# Patient Record
Sex: FEMALE | URBAN - NONMETROPOLITAN AREA
[De-identification: names, ages, dates, MRNs, and addresses within clinical notes are randomized per-mention and may not be internally consistent; named-entity substitution may affect disease eponyms.]

---

## 2023-03-10 LAB
ALBUMIN SERPL-MCNC: 3.2 G/DL (ref 3.5–5.2)
ALP BLD-CCNC: 74 U/L (ref 35–104)
ALT SERPL-CCNC: 8 U/L (ref 0–32)
ANION GAP SERPL CALCULATED.3IONS-SCNC: 7 MMOL/L (ref 7–16)
AST SERPL-CCNC: 21 U/L (ref 0–31)
BASOPHILS ABSOLUTE: 0.12 E9/L (ref 0–0.2)
BASOPHILS RELATIVE PERCENT: 1.6 % (ref 0–2)
BILIRUB SERPL-MCNC: 0.5 MG/DL (ref 0–1.2)
BUN BLDV-MCNC: 16 MG/DL (ref 6–23)
CALCIUM SERPL-MCNC: 9.6 MG/DL (ref 8.6–10.2)
CHLORIDE BLD-SCNC: 103 MMOL/L (ref 98–107)
CHOLESTEROL, TOTAL: 136 MG/DL (ref 0–199)
CO2: 29 MMOL/L (ref 22–29)
CREAT SERPL-MCNC: 0.8 MG/DL (ref 0.5–1)
EOSINOPHILS ABSOLUTE: 0.22 E9/L (ref 0.05–0.5)
EOSINOPHILS RELATIVE PERCENT: 2.8 % (ref 0–6)
GFR SERPL CREATININE-BSD FRML MDRD: >60 ML/MIN/1.73
GLUCOSE BLD-MCNC: 80 MG/DL (ref 74–99)
HCT VFR BLD CALC: 42.2 % (ref 34–48)
HDLC SERPL-MCNC: 64 MG/DL
HEMOGLOBIN: 13.7 G/DL (ref 11.5–15.5)
IMMATURE GRANULOCYTES #: 0.02 E9/L
IMMATURE GRANULOCYTES %: 0.3 % (ref 0–5)
LDL CHOLESTEROL CALCULATED: 53 MG/DL (ref 0–99)
LYMPHOCYTES ABSOLUTE: 2.54 E9/L (ref 1.5–4)
LYMPHOCYTES RELATIVE PERCENT: 32.9 % (ref 20–42)
MCH RBC QN AUTO: 31.9 PG (ref 26–35)
MCHC RBC AUTO-ENTMCNC: 32.5 % (ref 32–34.5)
MCV RBC AUTO: 98.4 FL (ref 80–99.9)
MONOCYTES ABSOLUTE: 0.97 E9/L (ref 0.1–0.95)
MONOCYTES RELATIVE PERCENT: 12.5 % (ref 2–12)
NEUTROPHILS ABSOLUTE: 3.86 E9/L (ref 1.8–7.3)
NEUTROPHILS RELATIVE PERCENT: 49.9 % (ref 43–80)
PDW BLD-RTO: 14 FL (ref 11.5–15)
PLATELET # BLD: 247 E9/L (ref 130–450)
PMV BLD AUTO: 11.9 FL (ref 7–12)
POTASSIUM SERPL-SCNC: 4.3 MMOL/L (ref 3.5–5)
RBC # BLD: 4.29 E12/L (ref 3.5–5.5)
SODIUM BLD-SCNC: 139 MMOL/L (ref 132–146)
TOTAL PROTEIN: 5.3 G/DL (ref 6.4–8.3)
TRIGL SERPL-MCNC: 97 MG/DL (ref 0–149)
TSH SERPL DL<=0.05 MIU/L-ACNC: 5.79 UIU/ML (ref 0.27–4.2)
VITAMIN D 25-HYDROXY: 20 NG/ML (ref 30–100)
VLDLC SERPL CALC-MCNC: 19 MG/DL
WBC # BLD: 7.7 E9/L (ref 4.5–11.5)

## 2023-10-04 ENCOUNTER — OUTSIDE SERVICES (OUTPATIENT)
Dept: FAMILY MEDICINE CLINIC | Age: 88
End: 2023-10-04

## 2023-10-04 DIAGNOSIS — R60.0 BILATERAL LOWER EXTREMITY EDEMA: Primary | ICD-10-CM

## 2023-10-04 DIAGNOSIS — H61.22 IMPACTED CERUMEN OF LEFT EAR: ICD-10-CM

## 2023-10-04 DIAGNOSIS — E78.2 MIXED HYPERLIPIDEMIA: ICD-10-CM

## 2023-10-04 DIAGNOSIS — L03.116 CELLULITIS OF LEFT LOWER EXTREMITY: ICD-10-CM

## 2023-10-04 DIAGNOSIS — L29.9 ITCHING: ICD-10-CM

## 2023-10-04 NOTE — PROGRESS NOTES
10/4/2023    Honey Felton  1920    CC: This resident is being seen today for a follow-up evaluation visit with resident establishment under our services. Social history:  with no children. Denies any alcohol or smoking. Worked as a . Family history: Mother  at the age of 79 from heart complications and father at 68 due to unknown reasons. She has 3 sisters which are all . PMH:  She is a resident who has long-term medical conditions including , hypothyroidism, primary hyperparathyroidism, vitamin D deficiency, mixed hyperlipidemia, hypomagnesemia, hypokalemia, benign essential hypertension, Mobitz type II atrioventricular block, paroxysmal atrial fibrillation, sick sinus syndrome, osteoarthritis of the knee, senile osteoporosis, malignant neoplasm to the breast status post mastectomy, polyp of the colon, cardiac pacemaker in situ. ROS:  She is a 80 y.o. female resident who is being seen today for a follow-up evaluation visit with concerns regarding left lower extremity redness with edema. She was accompanied by her nephew to this visit. He does indicate that she has been at [facility] for the course of the last 3 years with anticipation of going to assisted living next week. He feels that this will be the best move as he has noticed that she has not been overly compliant with her medications. He states that she was actually seen in conjunction with Dr. Dilma sEpinal regarding her right shoulder with some complaints of pain due to a fall and he had placed her on Bactrim along with cephalexin for cellulitis to her lower extremity and he realized that she had not been consistent with her medications. She denies any pain with respect to the leg but also admits that she has had some increased edema to the right lower extremity as well.   She furthermore has no complaints regarding headaches or dizziness, sore throat, chest pain, nausea or vomiting,

## 2023-10-11 LAB
25(OH)D3 SERPL-MCNC: 21.7 NG/ML (ref 30–100)
ALBUMIN SERPL-MCNC: 3.5 G/DL (ref 3.5–5.2)
ALP SERPL-CCNC: 105 U/L (ref 35–104)
ALT SERPL-CCNC: 8 U/L (ref 0–32)
ANION GAP SERPL CALCULATED.3IONS-SCNC: 15 MMOL/L (ref 7–16)
AST SERPL-CCNC: 18 U/L (ref 0–31)
BASOPHILS # BLD: 0.13 K/UL (ref 0–0.2)
BASOPHILS NFR BLD: 2 % (ref 0–2)
BILIRUB SERPL-MCNC: 0.2 MG/DL (ref 0–1.2)
BUN SERPL-MCNC: 24 MG/DL (ref 6–23)
CALCIUM SERPL-MCNC: 10.2 MG/DL (ref 8.6–10.2)
CHLORIDE SERPL-SCNC: 106 MMOL/L (ref 98–107)
CHOLEST SERPL-MCNC: 153 MG/DL
CO2 SERPL-SCNC: 24 MMOL/L (ref 22–29)
CREAT SERPL-MCNC: 1.3 MG/DL (ref 0.5–1)
EOSINOPHIL # BLD: 0.27 K/UL (ref 0.05–0.5)
EOSINOPHILS RELATIVE PERCENT: 3 % (ref 0–6)
ERYTHROCYTE [DISTWIDTH] IN BLOOD BY AUTOMATED COUNT: 13.7 % (ref 11.5–15)
GFR SERPL CREATININE-BSD FRML MDRD: 38 ML/MIN/1.73M2
GLUCOSE SERPL-MCNC: 102 MG/DL (ref 74–99)
HCT VFR BLD AUTO: 41.7 % (ref 34–48)
HDLC SERPL-MCNC: 81 MG/DL
HGB BLD-MCNC: 13.1 G/DL (ref 11.5–15.5)
IMM GRANULOCYTES # BLD AUTO: 0.08 K/UL (ref 0–0.58)
IMM GRANULOCYTES NFR BLD: 1 % (ref 0–5)
LDLC SERPL CALC-MCNC: 55 MG/DL
LYMPHOCYTES NFR BLD: 2.21 K/UL (ref 1.5–4)
LYMPHOCYTES RELATIVE PERCENT: 26 % (ref 20–42)
MCH RBC QN AUTO: 32.2 PG (ref 26–35)
MCHC RBC AUTO-ENTMCNC: 31.4 G/DL (ref 32–34.5)
MCV RBC AUTO: 102.5 FL (ref 80–99.9)
MONOCYTES NFR BLD: 1.18 K/UL (ref 0.1–0.95)
MONOCYTES NFR BLD: 14 % (ref 2–12)
NEUTROPHILS NFR BLD: 55 % (ref 43–80)
NEUTS SEG NFR BLD: 4.76 K/UL (ref 1.8–7.3)
PLATELET # BLD AUTO: 402 K/UL (ref 130–450)
PMV BLD AUTO: 10.3 FL (ref 7–12)
POTASSIUM SERPL-SCNC: 4.6 MMOL/L (ref 3.5–5)
PROT SERPL-MCNC: 6.6 G/DL (ref 6.4–8.3)
RBC # BLD AUTO: 4.07 M/UL (ref 3.5–5.5)
SODIUM SERPL-SCNC: 145 MMOL/L (ref 132–146)
TRIGL SERPL-MCNC: 85 MG/DL
TSH SERPL DL<=0.05 MIU/L-ACNC: 3.87 UIU/ML (ref 0.27–4.2)
VIT B12 SERPL-MCNC: 727 PG/ML (ref 211–946)
VLDLC SERPL CALC-MCNC: 17 MG/DL
WBC OTHER # BLD: 8.6 K/UL (ref 4.5–11.5)

## 2023-10-18 LAB
ANION GAP SERPL CALCULATED.3IONS-SCNC: 12 MMOL/L (ref 7–16)
BUN SERPL-MCNC: 39 MG/DL (ref 6–23)
CALCIUM SERPL-MCNC: 10 MG/DL (ref 8.6–10.2)
CHLORIDE SERPL-SCNC: 108 MMOL/L (ref 98–107)
CO2 SERPL-SCNC: 25 MMOL/L (ref 22–29)
CREAT SERPL-MCNC: 1.8 MG/DL (ref 0.5–1)
GFR SERPL CREATININE-BSD FRML MDRD: 25 ML/MIN/1.73M2
GLUCOSE SERPL-MCNC: 100 MG/DL (ref 74–99)
POTASSIUM SERPL-SCNC: 4.2 MMOL/L (ref 3.5–5)
SODIUM SERPL-SCNC: 145 MMOL/L (ref 132–146)

## 2023-10-19 ENCOUNTER — OUTSIDE SERVICES (OUTPATIENT)
Dept: FAMILY MEDICINE CLINIC | Age: 88
End: 2023-10-19

## 2023-10-19 DIAGNOSIS — R94.4 DECREASED GFR: ICD-10-CM

## 2023-10-19 DIAGNOSIS — R60.0 BILATERAL LOWER EXTREMITY EDEMA: ICD-10-CM

## 2023-10-19 DIAGNOSIS — E78.2 MIXED HYPERLIPIDEMIA: ICD-10-CM

## 2023-10-19 DIAGNOSIS — E03.9 HYPOTHYROIDISM, UNSPECIFIED TYPE: ICD-10-CM

## 2023-10-19 DIAGNOSIS — E55.9 VITAMIN D DEFICIENCY: Primary | ICD-10-CM

## 2023-10-20 NOTE — PROGRESS NOTES
10/19/2023    Harris Diallo  8/17/1920    This resident is being seen today for a follow-up evaluation visit with resident transition to assisted living. She is a resident who has long-term medical conditions including , hypothyroidism, primary hyperparathyroidism, vitamin D deficiency, mixed hyperlipidemia, hypomagnesemia, hypokalemia, benign essential hypertension, Mobitz type II atrioventricular block, paroxysmal atrial fibrillation, sick sinus syndrome, osteoarthritis of the knee, senile osteoporosis, malignant neoplasm to the breast status post mastectomy, polyp of the colon, cardiac pacemaker in situ. She is a 80 y.o. female resident who is being seen today for a follow-up evaluation with resident transitioning to assisted living. Staff does indicate that she has been tolerating her move rather well. She was in no acute distress on today's evaluation and offers no complaints with respect to pain, headaches or dizziness, sore throat, chest pain, coughing or shortness of breath, nausea or vomiting, constipation or diarrhea, fever or chills, falls or syncopal events. Medications:  Potassium chloride 10 mill equivalents daily  Simvastatin 20 mg daily  Levothyroxine 50 mcg daily  Diltiazem 180 mg daily  Aspirin 81 mg daily  Vitamin D3 3000 units daily  Alendronate 70 mg weekly        Objective     Vital Signs: /55 pulse 101 respirations 18 temperature 97.6 O2 95% weight 144. 4        Physical examination:Skin is essentially warm and dry. HEENT left ear with cerumen impaction but otherwise unremarkable. Neck is supple. Heart regular rate and rhythm. No rubs, gallops or murmurs noted. Lungs are clear to auscultation. No evidence of rhonchi, rales, or wheezing. Abdomen is soft, supple and non-tender. Bowel sounds are noted x4 quadrants. No rigidity, guarding or rebound tenderness. Negative Abraham's, negative McBurney's, negative Tomas's.   Extremities; 1+ edema noted to the bilateral lower

## 2023-10-26 ENCOUNTER — OUTSIDE SERVICES (OUTPATIENT)
Dept: FAMILY MEDICINE CLINIC | Age: 88
End: 2023-10-26

## 2023-10-26 DIAGNOSIS — M81.0 AGE-RELATED OSTEOPOROSIS WITHOUT CURRENT PATHOLOGICAL FRACTURE: ICD-10-CM

## 2023-10-26 DIAGNOSIS — R13.10 DYSPHAGIA, UNSPECIFIED TYPE: Primary | ICD-10-CM

## 2023-10-26 DIAGNOSIS — E21.3 HYPERPARATHYROIDISM (HCC): ICD-10-CM

## 2023-10-26 DIAGNOSIS — E83.42 HYPOMAGNESEMIA: ICD-10-CM

## 2023-10-26 DIAGNOSIS — E03.9 HYPOTHYROIDISM, UNSPECIFIED TYPE: ICD-10-CM

## 2023-10-26 LAB
ANION GAP SERPL CALCULATED.3IONS-SCNC: 13 MMOL/L (ref 7–16)
BUN SERPL-MCNC: 24 MG/DL (ref 6–23)
CALCIUM SERPL-MCNC: 9 MG/DL (ref 8.6–10.2)
CHLORIDE SERPL-SCNC: 107 MMOL/L (ref 98–107)
CO2 SERPL-SCNC: 25 MMOL/L (ref 22–29)
CREAT SERPL-MCNC: 1.5 MG/DL (ref 0.5–1)
GFR SERPL CREATININE-BSD FRML MDRD: 32 ML/MIN/1.73M2
GLUCOSE SERPL-MCNC: 93 MG/DL (ref 74–99)
POTASSIUM SERPL-SCNC: 3.9 MMOL/L (ref 3.5–5)
SODIUM SERPL-SCNC: 145 MMOL/L (ref 132–146)

## 2023-11-10 ENCOUNTER — OUTSIDE SERVICES (OUTPATIENT)
Dept: FAMILY MEDICINE CLINIC | Age: 88
End: 2023-11-10

## 2023-11-10 DIAGNOSIS — R94.4 DECREASED GFR: ICD-10-CM

## 2023-11-10 DIAGNOSIS — M81.0 AGE-RELATED OSTEOPOROSIS WITHOUT CURRENT PATHOLOGICAL FRACTURE: ICD-10-CM

## 2023-11-10 DIAGNOSIS — E55.9 VITAMIN D DEFICIENCY: Primary | ICD-10-CM

## 2023-11-10 DIAGNOSIS — E03.9 HYPOTHYROIDISM, UNSPECIFIED TYPE: ICD-10-CM

## 2023-11-10 DIAGNOSIS — E78.2 MIXED HYPERLIPIDEMIA: ICD-10-CM

## 2023-11-11 NOTE — PROGRESS NOTES
11/9/2023        Manolo lAlen  8/17/1920    This resident is being seen today for a follow-up evaluation visit with resident transition to assisted living. She is a resident who has long-term medical conditions including , hypothyroidism, primary hyperparathyroidism, vitamin D deficiency, mixed hyperlipidemia, hypomagnesemia, hypokalemia, benign essential hypertension, Mobitz type II atrioventricular block, paroxysmal atrial fibrillation, sick sinus syndrome, osteoarthritis of the knee, senile osteoporosis, malignant neoplasm to the breast status post mastectomy, polyp of the colon, cardiac pacemaker in situ. She is a 80 y.o. female resident who is being seen today for a follow-up visit with this resident recently seen in conjunction with orthopedics on November 7 and recommendations were given to continue with physical therapy for strengthening purposes. She was very pleasant on today's evaluation and denied any complaints. She had no issues with any headaches or dizziness, sore throat, chest pain, coughing or shortness of breath, nausea or vomiting, constipation or diarrhea, fever or chills, falls or syncopal events. Medications:  Potassium chloride 10 mill equivalents daily  Simvastatin 20 mg daily  Levothyroxine 50 mcg daily  Diltiazem 180 mg daily  Aspirin 81 mg daily  Vitamin D3 3000 units daily  Alendronate 70 mg weekly        Objective     Vital Signs: /60 pulse 90 respirations 18 temperature 96.7 O2 95% weight 135.4        Physical examination:Skin is essentially warm and dry. HEENT otherwise unremarkable. Neck is supple. Heart regular rate and rhythm. No rubs, gallops or murmurs noted. Lungs are clear to auscultation. No evidence of rhonchi, rales, or wheezing. Abdomen is soft, supple and non-tender. Bowel sounds are noted x4 quadrants. No rigidity, guarding or rebound tenderness. Negative Abraham's, negative McBurney's, negative Tomas's.   Extremities; 1+ edema noted to the

## 2023-12-06 ENCOUNTER — OUTSIDE SERVICES (OUTPATIENT)
Dept: FAMILY MEDICINE CLINIC | Age: 88
End: 2023-12-06

## 2023-12-06 DIAGNOSIS — E78.2 MIXED HYPERLIPIDEMIA: Primary | ICD-10-CM

## 2023-12-06 DIAGNOSIS — E03.9 HYPOTHYROIDISM, UNSPECIFIED TYPE: ICD-10-CM

## 2023-12-06 DIAGNOSIS — E21.3 HYPERPARATHYROIDISM (HCC): ICD-10-CM

## 2023-12-06 DIAGNOSIS — E83.42 HYPOMAGNESEMIA: ICD-10-CM

## 2023-12-06 DIAGNOSIS — M81.0 AGE-RELATED OSTEOPOROSIS WITHOUT CURRENT PATHOLOGICAL FRACTURE: ICD-10-CM

## 2023-12-07 NOTE — PROGRESS NOTES
11/9/2023        Cherylene Sport  8/17/1920    This resident is being seen today for a follow-up evaluation visit with resident transition to assisted living. She is a resident who has long-term medical conditions including , hypothyroidism, primary hyperparathyroidism, vitamin D deficiency, mixed hyperlipidemia, hypomagnesemia, hypokalemia, benign essential hypertension, Mobitz type II atrioventricular block, paroxysmal atrial fibrillation, sick sinus syndrome, osteoarthritis of the knee, senile osteoporosis, malignant neoplasm to the breast status post mastectomy, polyp of the colon, cardiac pacemaker in situ. She is a 80 y.o. female resident who is being seen today for a follow-up visit evaluation with this resident alert and responsive to verbal and tactile stimuli and very pleasant [place] Mussel kidding that she has had no changes in behavior in terms of aggressive, combative, or disruptive behaviors. She offers no acute complaints with respect to pain, headaches or dizziness, sore throat, chest pain, coughing or shortness of breath, nausea or vomiting, constipation or diarrhea, fever or chills, falls or syncopal events. Medications:  Potassium chloride 10 mill equivalents daily  Simvastatin 20 mg daily  Levothyroxine 50 mcg daily  Diltiazem 180 mg daily  Aspirin 81 mg daily  Vitamin D3 3000 units daily  Alendronate 70 mg weekly  Niacin 1000 mg twice daily        Objective     Vital Signs: /60 pulse 90 respirations 18 temperature 96.7 O2 95% weight 135.4        Physical examination:Skin is essentially warm and dry. HEENT otherwise unremarkable. Neck is supple. Heart regular rate and rhythm. No rubs, gallops or murmurs noted. Lungs are clear to auscultation. No evidence of rhonchi, rales, or wheezing. Abdomen is soft, supple and non-tender. Bowel sounds are noted x4 quadrants. No rigidity, guarding or rebound tenderness. Negative Abraham's, negative McBurney's, negative Tomas's.

## 2023-12-29 LAB
MICROORGANISM SPEC CULT: ABNORMAL
MICROORGANISM/AGENT SPEC: ABNORMAL
SPECIMEN DESCRIPTION: ABNORMAL

## 2024-01-05 LAB
ALBUMIN SERPL-MCNC: 2.6 G/DL (ref 3.5–5.2)
ALP SERPL-CCNC: 78 U/L (ref 35–104)
ALT SERPL-CCNC: 14 U/L (ref 0–32)
ANION GAP SERPL CALCULATED.3IONS-SCNC: 10 MMOL/L (ref 7–16)
AST SERPL-CCNC: 32 U/L (ref 0–31)
BASOPHILS # BLD: 0.09 K/UL (ref 0–0.2)
BASOPHILS NFR BLD: 1 % (ref 0–2)
BILIRUB SERPL-MCNC: 0.4 MG/DL (ref 0–1.2)
BUN SERPL-MCNC: 11 MG/DL (ref 6–23)
CALCIUM SERPL-MCNC: 8.8 MG/DL (ref 8.6–10.2)
CHLORIDE SERPL-SCNC: 104 MMOL/L (ref 98–107)
CO2 SERPL-SCNC: 28 MMOL/L (ref 22–29)
CREAT SERPL-MCNC: 0.9 MG/DL (ref 0.5–1)
EOSINOPHIL # BLD: 0.34 K/UL (ref 0.05–0.5)
EOSINOPHILS RELATIVE PERCENT: 3 % (ref 0–6)
ERYTHROCYTE [DISTWIDTH] IN BLOOD BY AUTOMATED COUNT: 17.5 % (ref 11.5–15)
GFR SERPL CREATININE-BSD FRML MDRD: 55 ML/MIN/1.73M2
GLUCOSE SERPL-MCNC: 77 MG/DL (ref 74–99)
HCT VFR BLD AUTO: 33.1 % (ref 34–48)
HGB BLD-MCNC: 10.4 G/DL (ref 11.5–15.5)
IMM GRANULOCYTES # BLD AUTO: 0.25 K/UL (ref 0–0.58)
IMM GRANULOCYTES NFR BLD: 2 % (ref 0–5)
LYMPHOCYTES NFR BLD: 1.63 K/UL (ref 1.5–4)
LYMPHOCYTES RELATIVE PERCENT: 15 % (ref 20–42)
MCH RBC QN AUTO: 31.3 PG (ref 26–35)
MCHC RBC AUTO-ENTMCNC: 31.4 G/DL (ref 32–34.5)
MCV RBC AUTO: 99.7 FL (ref 80–99.9)
MONOCYTES NFR BLD: 1.32 K/UL (ref 0.1–0.95)
MONOCYTES NFR BLD: 12 % (ref 2–12)
NEUTROPHILS NFR BLD: 67 % (ref 43–80)
NEUTS SEG NFR BLD: 7.49 K/UL (ref 1.8–7.3)
PLATELET # BLD AUTO: 218 K/UL (ref 130–450)
PMV BLD AUTO: 10.6 FL (ref 7–12)
POTASSIUM SERPL-SCNC: 3.8 MMOL/L (ref 3.5–5)
PROT SERPL-MCNC: 5.3 G/DL (ref 6.4–8.3)
RBC # BLD AUTO: 3.32 M/UL (ref 3.5–5.5)
SODIUM SERPL-SCNC: 142 MMOL/L (ref 132–146)
WBC OTHER # BLD: 11.1 K/UL (ref 4.5–11.5)

## 2024-01-07 LAB
SEND OUT REPORT: NORMAL
TEST NAME: NORMAL

## 2024-01-11 ENCOUNTER — OUTSIDE SERVICES (OUTPATIENT)
Dept: FAMILY MEDICINE CLINIC | Age: 89
End: 2024-01-11

## 2024-01-11 DIAGNOSIS — N17.9 AKI (ACUTE KIDNEY INJURY) (HCC): ICD-10-CM

## 2024-01-11 DIAGNOSIS — N18.9 CHRONIC KIDNEY DISEASE, UNSPECIFIED CKD STAGE: ICD-10-CM

## 2024-01-11 DIAGNOSIS — Z76.89 ENCOUNTER TO ESTABLISH CARE: Primary | ICD-10-CM

## 2024-01-11 DIAGNOSIS — L03.116 CELLULITIS OF LEFT LOWER EXTREMITY: ICD-10-CM

## 2024-01-11 DIAGNOSIS — R94.4 DECREASED GFR: ICD-10-CM

## 2024-01-17 ENCOUNTER — OUTSIDE SERVICES (OUTPATIENT)
Dept: FAMILY MEDICINE CLINIC | Age: 89
End: 2024-01-17

## 2024-01-17 DIAGNOSIS — E55.9 VITAMIN D DEFICIENCY: Primary | ICD-10-CM

## 2024-01-17 DIAGNOSIS — E03.9 HYPOTHYROIDISM, UNSPECIFIED TYPE: ICD-10-CM

## 2024-01-17 DIAGNOSIS — M81.0 AGE-RELATED OSTEOPOROSIS WITHOUT CURRENT PATHOLOGICAL FRACTURE: ICD-10-CM

## 2024-01-17 DIAGNOSIS — E78.2 MIXED HYPERLIPIDEMIA: ICD-10-CM

## 2024-01-17 DIAGNOSIS — R60.0 BILATERAL LOWER EXTREMITY EDEMA: ICD-10-CM

## 2024-01-18 LAB
25(OH)D3 SERPL-MCNC: 33.9 NG/ML (ref 30–100)
ALBUMIN SERPL-MCNC: 2.9 G/DL (ref 3.5–5.2)
ALP SERPL-CCNC: 79 U/L (ref 35–104)
ALT SERPL-CCNC: 7 U/L (ref 0–32)
ANION GAP SERPL CALCULATED.3IONS-SCNC: 12 MMOL/L (ref 7–16)
AST SERPL-CCNC: 22 U/L (ref 0–31)
BASOPHILS # BLD: 0.07 K/UL (ref 0–0.2)
BASOPHILS NFR BLD: 1 % (ref 0–2)
BILIRUB SERPL-MCNC: 0.4 MG/DL (ref 0–1.2)
BUN SERPL-MCNC: 16 MG/DL (ref 6–23)
CALCIUM SERPL-MCNC: 9.7 MG/DL (ref 8.6–10.2)
CHLORIDE SERPL-SCNC: 101 MMOL/L (ref 98–107)
CO2 SERPL-SCNC: 26 MMOL/L (ref 22–29)
CREAT SERPL-MCNC: 1 MG/DL (ref 0.5–1)
EOSINOPHIL # BLD: 0.41 K/UL (ref 0.05–0.5)
EOSINOPHILS RELATIVE PERCENT: 4 % (ref 0–6)
ERYTHROCYTE [DISTWIDTH] IN BLOOD BY AUTOMATED COUNT: 17.2 % (ref 11.5–15)
GFR SERPL CREATININE-BSD FRML MDRD: 51 ML/MIN/1.73M2
GLUCOSE SERPL-MCNC: 85 MG/DL (ref 74–99)
HCT VFR BLD AUTO: 29.9 % (ref 34–48)
HGB BLD-MCNC: 9.5 G/DL (ref 11.5–15.5)
IMM GRANULOCYTES # BLD AUTO: 0.13 K/UL (ref 0–0.58)
IMM GRANULOCYTES NFR BLD: 1 % (ref 0–5)
LYMPHOCYTES NFR BLD: 1.62 K/UL (ref 1.5–4)
LYMPHOCYTES RELATIVE PERCENT: 14 % (ref 20–42)
MCH RBC QN AUTO: 31.7 PG (ref 26–35)
MCHC RBC AUTO-ENTMCNC: 31.8 G/DL (ref 32–34.5)
MCV RBC AUTO: 99.7 FL (ref 80–99.9)
MONOCYTES NFR BLD: 1.07 K/UL (ref 0.1–0.95)
MONOCYTES NFR BLD: 9 % (ref 2–12)
NEUTROPHILS NFR BLD: 71 % (ref 43–80)
NEUTS SEG NFR BLD: 8.06 K/UL (ref 1.8–7.3)
PLATELET # BLD AUTO: 205 K/UL (ref 130–450)
PMV BLD AUTO: 10.6 FL (ref 7–12)
POTASSIUM SERPL-SCNC: 4.1 MMOL/L (ref 3.5–5)
PROT SERPL-MCNC: 6.2 G/DL (ref 6.4–8.3)
RBC # BLD AUTO: 3 M/UL (ref 3.5–5.5)
SODIUM SERPL-SCNC: 139 MMOL/L (ref 132–146)
TSH SERPL DL<=0.05 MIU/L-ACNC: 1.71 UIU/ML (ref 0.27–4.2)
WBC OTHER # BLD: 11.4 K/UL (ref 4.5–11.5)

## 2024-01-18 NOTE — PROGRESS NOTES
1/17/2024      Kary Arango  8/17/1920    This resident is being seen today for a follow-up evaluation visit with resident transition to assisted living.   She is a resident who has long-term medical conditions including , hypothyroidism, primary hyperparathyroidism, vitamin D deficiency, mixed hyperlipidemia, hypomagnesemia, hypokalemia, benign essential hypertension, Mobitz type II atrioventricular block, paroxysmal atrial fibrillation, sick sinus syndrome, osteoarthritis of the knee, senile osteoporosis, malignant neoplasm to the breast status post mastectomy, polyp of the colon, cardiac pacemaker in situ. She is a 89 y.o. female resident who is being seen today for a follow-up visit with this resident recently returning from [facility] on January 15, 2024.  She is a resident who had an apparent chest x-ray completed December 24 was given the benefit of Rocephin therapies for the course of 3 days and was given oxygen supplementation as well as after diuretics and oral antibiotic therapy but unfortunately had made no improvements and was sent to the emergency room setting by December 31.  At this time she has no upper respiratory distress and furthermore denies any complaints in terms of sore throat, chest pain, coughing or shortness of breath, nausea or vomiting, constipation or diarrhea, fever or chills, syncopal events or seizure activity.            Medications:  Calcium carbonate 750 mg daily  Vitamin D3 3000 units daily  K-Tab 20 mill equivalents daily  Niacinamide 500 mg twice daily  Levothyroxine 50 mcg daily  Multivitamin with mineral daily  Cardizem  mg daily  Tylenol arthritis 650 mg every 4 hours as needed  Atorvastatin 10 mg daily  Aspirin 81 mg daily   Bumex 1 mg twice daily            Objective     Vital Signs: /68 pulse 62 respirations 18 temperature 97.8 O2 92% weight 135.4        Physical examination:Skin is essentially warm and dry. HEENT otherwise

## 2024-02-07 LAB
BASOPHILS # BLD: 0.09 K/UL (ref 0–0.2)
BASOPHILS NFR BLD: 1 % (ref 0–2)
EOSINOPHIL # BLD: 0.47 K/UL (ref 0.05–0.5)
EOSINOPHILS RELATIVE PERCENT: 6 % (ref 0–6)
ERYTHROCYTE [DISTWIDTH] IN BLOOD BY AUTOMATED COUNT: 16.8 % (ref 11.5–15)
FERRITIN SERPL-MCNC: 165 NG/ML
HCT VFR BLD AUTO: 31.8 % (ref 34–48)
HGB BLD-MCNC: 10.1 G/DL (ref 11.5–15.5)
IMM GRANULOCYTES # BLD AUTO: 0.03 K/UL (ref 0–0.58)
IMM GRANULOCYTES NFR BLD: 0 % (ref 0–5)
IRON SATN MFR SERPL: 26 % (ref 15–50)
IRON SERPL-MCNC: 49 UG/DL (ref 37–145)
LYMPHOCYTES NFR BLD: 2.33 K/UL (ref 1.5–4)
LYMPHOCYTES RELATIVE PERCENT: 31 % (ref 20–42)
MCH RBC QN AUTO: 32.4 PG (ref 26–35)
MCHC RBC AUTO-ENTMCNC: 31.8 G/DL (ref 32–34.5)
MCV RBC AUTO: 101.9 FL (ref 80–99.9)
MONOCYTES NFR BLD: 1.14 K/UL (ref 0.1–0.95)
MONOCYTES NFR BLD: 15 % (ref 2–12)
NEUTROPHILS NFR BLD: 46 % (ref 43–80)
NEUTS SEG NFR BLD: 3.5 K/UL (ref 1.8–7.3)
PLATELET # BLD AUTO: 192 K/UL (ref 130–450)
PMV BLD AUTO: 10.2 FL (ref 7–12)
RBC # BLD AUTO: 3.12 M/UL (ref 3.5–5.5)
TIBC SERPL-MCNC: 192 UG/DL (ref 250–450)
WBC OTHER # BLD: 7.6 K/UL (ref 4.5–11.5)

## 2024-02-09 ENCOUNTER — OUTSIDE SERVICES (OUTPATIENT)
Dept: FAMILY MEDICINE CLINIC | Age: 89
End: 2024-02-09

## 2024-02-09 DIAGNOSIS — E03.9 HYPOTHYROIDISM, UNSPECIFIED TYPE: ICD-10-CM

## 2024-02-09 DIAGNOSIS — R79.0 DECREASED IRON STORES: ICD-10-CM

## 2024-02-09 DIAGNOSIS — E55.9 VITAMIN D DEFICIENCY: ICD-10-CM

## 2024-02-09 DIAGNOSIS — N18.9 CHRONIC KIDNEY DISEASE, UNSPECIFIED CKD STAGE: ICD-10-CM

## 2024-02-09 DIAGNOSIS — R63.4 WEIGHT LOSS: Primary | ICD-10-CM

## 2024-02-09 NOTE — PROGRESS NOTES
rales, or wheezing. Abdomen is soft, supple and non-tender.  Bowel sounds are noted x4 quadrants. No rigidity, guarding or rebound tenderness.  Negative Abraham's, negative McBurney's, negative Tomas's.  Extremities; mild edema noted to the bilateral lower extremities with the left lower extremity greater than the right with some evidence of redness bilaterally and scabbing to the left lower extremity.  Pulses are adequate. No clubbing  or no cyanosis noted.  Neurologically she  is alert and oriented x3.  No evidence of paralysis or paresthesias noted.    Diagnoses and all orders for this visit:    Weight loss  Comments:  Discontinue atorvastatin and initiate Remeron 7.5 mg daily    Decreased iron stores  Comments:  Initiate ferrous sulfate 325 mg daily    Chronic kidney disease, unspecified CKD stage  Comments:  Maintain lowest effective dosing of diuretic management    Hypothyroidism, unspecified type  Comments:  Currently controlled with levothyroxine    Vitamin D deficiency  Comments:  Controlled with vitamin D supplementation                      Plan:  Plan of care was discussed with the healthcare team with medications and labs reviewed.  H/H10.1/31.8 TIBC 192 iron 49 ferritin 165 TSH 1.71.  Due to the deficiency in the iron level, I am did recommend ferrous sulfate 325 mg daily.  I am furthermore going to discontinue her atorvastatin due to weight loss and place her on low-dose Remeron 7.5 mg at bedtime.  I am going to discontinue the recommendation for the stools for occult blood due to her age and the fact that there would be no further intervention in terms of GI workup.  She is otherwise can continue with her current management as stated and I will see her routinely and as needed with further orders forthcoming.    JULIA FERNANDEZ, APRN - CNP      *Note was creating using voice recognition software.  The document was reviewed however grammatical errors may exist.

## 2024-04-10 ENCOUNTER — OUTSIDE SERVICES (OUTPATIENT)
Dept: FAMILY MEDICINE CLINIC | Age: 89
End: 2024-04-10

## 2024-04-10 DIAGNOSIS — E78.2 MIXED HYPERLIPIDEMIA: ICD-10-CM

## 2024-04-10 DIAGNOSIS — R60.0 BILATERAL LOWER EXTREMITY EDEMA: ICD-10-CM

## 2024-04-10 DIAGNOSIS — E21.3 HYPERPARATHYROIDISM (HCC): ICD-10-CM

## 2024-04-10 DIAGNOSIS — R94.4 DECREASED GFR: ICD-10-CM

## 2024-04-10 DIAGNOSIS — L03.119 CELLULITIS OF LOWER EXTREMITY, UNSPECIFIED LATERALITY: Primary | ICD-10-CM

## 2024-04-11 NOTE — PROGRESS NOTES
4/10/2024      Kary Najeramarylu  8/17/1920    This resident is being seen today for a follow-up evaluation visit.   She is a resident who has long-term medical conditions including , hypothyroidism, primary hyperparathyroidism, vitamin D deficiency, mixed hyperlipidemia, hypomagnesemia, hypokalemia, benign essential hypertension, Mobitz type II atrioventricular block, paroxysmal atrial fibrillation, sick sinus syndrome, osteoarthritis of the knee, senile osteoporosis, malignant neoplasm to the breast status post mastectomy, polyp of the colon, cardiac pacemaker in situ. She is a 89 y.o. female resident who is being seen today for a follow-up visit with which this resident was recently assessed for underlying edema to the bilateral lower extremities with cellulitis and was treated with doxycycline for 10 days with Bactroban to open areas with Aquaphor.  She states that the area is mostly itchy as opposed to pain.  She furthermore states that she has been doing well and denies any headaches or dizziness, sore throat, chest pain, coughing or shortness of breath, nausea or vomiting, constipation or diarrhea, fever or chills, falls or syncopal events.          Medications:  Calcium carbonate 750 mg daily  Vitamin D3 3000 units daily  K-Tab 20 mill equivalents daily  Niacinamide 500 mg twice daily  Levothyroxine 50 mcg daily  Multivitamin with mineral daily  Cardizem  mg daily  Tylenol arthritis 650 mg every 4 hours as needed  Aspirin 81 mg daily   Bumex 1 mg twice daily  Remeron 7.5 mg at bedtime  Ferrous sulfate 325 mg daily          Objective     Vital Signs: /65 pulse 74 respirations 18 temperature 97.4 O2 92% weight 118.2 pounds       Physical examination:Skin is essentially warm and dry. HEENT otherwise unremarkable. Neck is supple. Heart regular rate and rhythm. No rubs, gallops or murmurs noted.  Lungs are clear to auscultation.  No evidence of rhonchi, rales, or wheezing. Abdomen is soft,

## 2024-05-02 ENCOUNTER — OUTSIDE SERVICES (OUTPATIENT)
Dept: FAMILY MEDICINE CLINIC | Age: 89
End: 2024-05-02
Payer: MEDICARE

## 2024-05-02 DIAGNOSIS — E55.9 VITAMIN D DEFICIENCY: ICD-10-CM

## 2024-05-02 DIAGNOSIS — R23.8 SKIN BREAKDOWN: ICD-10-CM

## 2024-05-02 DIAGNOSIS — N18.9 CHRONIC KIDNEY DISEASE, UNSPECIFIED CKD STAGE: ICD-10-CM

## 2024-05-02 DIAGNOSIS — K59.00 CONSTIPATION, UNSPECIFIED CONSTIPATION TYPE: Primary | ICD-10-CM

## 2024-05-02 DIAGNOSIS — E03.9 HYPOTHYROIDISM, UNSPECIFIED TYPE: ICD-10-CM

## 2024-05-02 PROCEDURE — 99349 HOME/RES VST EST MOD MDM 40: CPT | Performed by: NURSE PRACTITIONER

## 2024-05-03 NOTE — PROGRESS NOTES
5/2/2024      Kary Trujillos  8/17/1920    This resident is being seen today for a follow-up evaluation visit.   She is a resident who has long-term medical conditions including , hypothyroidism, primary hyperparathyroidism, vitamin D deficiency, mixed hyperlipidemia, hypomagnesemia, hypokalemia, benign essential hypertension, Mobitz type II atrioventricular block, paroxysmal atrial fibrillation, sick sinus syndrome, osteoarthritis of the knee, senile osteoporosis, malignant neoplasm to the breast status post mastectomy, polyp of the colon, cardiac pacemaker in situ. She is a 89 y.o. female resident who is being seen today for a follow-up evaluation with resident indicating today that she has some underlying constipation.  Furthermore, she does remain under assessment for some areas to her bilateral lower extremities with which staff indicates that she does tend to scratch at when given the opportunity.  Indicate that the legs were doing remarkably well until the wraps were left off for her shower and she apparently scratched them and worsened them.  However, there is no infectious etiology noted.  She denies complaints of pain with respect to the areas and furthermore has no headaches or dizziness, sore throat, chest pain, coughing or shortness of breath, nausea or vomiting, diarrhea, fever or chills, falls or syncopal events.          Medications:  Calcium carbonate 750 mg daily  Vitamin D3 3000 units daily  K-Tab 20 mill equivalents daily  Niacinamide 500 mg twice daily  Levothyroxine 50 mcg daily  Multivitamin with mineral daily  Cardizem  mg daily  Tylenol arthritis 650 mg every 4 hours as needed  Aspirin 81 mg daily   Bumex 1 mg twice daily  Remeron 7.5 mg at bedtime  Ferrous sulfate 325 mg daily  Colace 100 mg daily          Objective     Vital Signs: /53 pulse 61 respirations 18 temperature 97.3 O2 95% weight 114.6 pounds       Physical examination:Skin is essentially warm and dry. HEENT

## 2024-06-20 ENCOUNTER — OUTSIDE SERVICES (OUTPATIENT)
Dept: FAMILY MEDICINE CLINIC | Age: 89
End: 2024-06-20

## 2024-06-20 DIAGNOSIS — R23.8 SKIN BREAKDOWN: Primary | ICD-10-CM

## 2024-06-20 DIAGNOSIS — E21.3 HYPERPARATHYROIDISM (HCC): ICD-10-CM

## 2024-06-20 DIAGNOSIS — E78.2 MIXED HYPERLIPIDEMIA: ICD-10-CM

## 2024-06-20 DIAGNOSIS — E55.9 VITAMIN D DEFICIENCY: ICD-10-CM

## 2024-06-20 DIAGNOSIS — M81.0 AGE-RELATED OSTEOPOROSIS WITHOUT CURRENT PATHOLOGICAL FRACTURE: ICD-10-CM

## 2024-06-20 NOTE — PROGRESS NOTES
6/20/2024      Kary Najeramarylu  8/17/1920    This resident is being seen today for a follow-up evaluation visit.   She is a resident who has long-term medical conditions including , hypothyroidism, primary hyperparathyroidism, vitamin D deficiency, mixed hyperlipidemia, hypomagnesemia, hypokalemia, benign essential hypertension, Mobitz type II atrioventricular block, paroxysmal atrial fibrillation, sick sinus syndrome, osteoarthritis of the knee, senile osteoporosis, malignant neoplasm to the breast status post mastectomy, polyp of the colon, cardiac pacemaker in situ. She is a 89 y.o. female resident who is being seen today for a follow-up visit with which she has had some ongoing concerns to her bilateral lower extremities and they have been being treated open routinely but unfortunately have not had complete resolution at this time.  She does not admit to any pain associated with her legs but just \"itching.\"  She has no headaches or dizziness, sore throat, chest pain, nausea or vomiting, constipation or diarrhea, fever or chills, falls or syncopal events.        Medications:  Antiacid 750 mg daily  Aquaphor to the bilateral lower extremities daily  Aspirin 81 mg daily  Bumetanide 1 mg twice daily  Diltiazem 180 mg ER daily  Docusate sodium 100 mg daily  Ferrous sulfate 325 mg daily  Levothyroxine 50 mcg daily  Medihoney gel wound to the bilateral lower extremities  Mirtazapine 7.5 mg at bedtime  Multivitamin daily  Mupirocin 2% ointment  Niacin 500 mg twice daily  Potassium chloride 20 mill equivalents daily  Vitamin D3 3000 units daily  Acetaminophen as needed      Objective     Vital Signs: /56 pulse 91 respirations 18 temperature 98.3 O2 94% weight 113 pounds       Physical examination:Skin is essentially warm and dry.  She does have some ongoing alcohol concerns to the bilateral lower extremities some of which are open with bleeding without infectious etiology.  HEENT otherwise unremarkable.

## 2024-06-21 LAB
25(OH)D3 SERPL-MCNC: 40 NG/ML (ref 30–100)
ALBUMIN SERPL-MCNC: 3.2 G/DL (ref 3.5–5.2)
ALP SERPL-CCNC: 68 U/L (ref 35–104)
ALT SERPL-CCNC: <5 U/L (ref 0–32)
ANION GAP SERPL CALCULATED.3IONS-SCNC: 12 MMOL/L (ref 7–16)
AST SERPL-CCNC: 20 U/L (ref 0–31)
BASOPHILS # BLD: 0.08 K/UL (ref 0–0.2)
BASOPHILS NFR BLD: 1 % (ref 0–2)
BILIRUB SERPL-MCNC: 0.5 MG/DL (ref 0–1.2)
BUN SERPL-MCNC: 23 MG/DL (ref 6–23)
CALCIUM SERPL-MCNC: 9.7 MG/DL (ref 8.6–10.2)
CHLORIDE SERPL-SCNC: 105 MMOL/L (ref 98–107)
CO2 SERPL-SCNC: 27 MMOL/L (ref 22–29)
CREAT SERPL-MCNC: 1.2 MG/DL (ref 0.5–1)
EOSINOPHIL # BLD: 0.33 K/UL (ref 0.05–0.5)
EOSINOPHILS RELATIVE PERCENT: 4 % (ref 0–6)
ERYTHROCYTE [DISTWIDTH] IN BLOOD BY AUTOMATED COUNT: 14.5 % (ref 11.5–15)
GFR, ESTIMATED: 41 ML/MIN/1.73M2
GLUCOSE SERPL-MCNC: 98 MG/DL (ref 74–99)
HCT VFR BLD AUTO: 33.3 % (ref 34–48)
HGB BLD-MCNC: 10.5 G/DL (ref 11.5–15.5)
IMM GRANULOCYTES # BLD AUTO: <0.03 K/UL (ref 0–0.58)
IMM GRANULOCYTES NFR BLD: 0 % (ref 0–5)
LYMPHOCYTES NFR BLD: 1.77 K/UL (ref 1.5–4)
LYMPHOCYTES RELATIVE PERCENT: 22 % (ref 20–42)
MCH RBC QN AUTO: 32.3 PG (ref 26–35)
MCHC RBC AUTO-ENTMCNC: 31.5 G/DL (ref 32–34.5)
MCV RBC AUTO: 102.5 FL (ref 80–99.9)
MONOCYTES NFR BLD: 0.98 K/UL (ref 0.1–0.95)
MONOCYTES NFR BLD: 12 % (ref 2–12)
NEUTROPHILS NFR BLD: 61 % (ref 43–80)
NEUTS SEG NFR BLD: 4.92 K/UL (ref 1.8–7.3)
PLATELET # BLD AUTO: 145 K/UL (ref 130–450)
PMV BLD AUTO: 11.1 FL (ref 7–12)
POTASSIUM SERPL-SCNC: 3.9 MMOL/L (ref 3.5–5)
PROT SERPL-MCNC: 6 G/DL (ref 6.4–8.3)
RBC # BLD AUTO: 3.25 M/UL (ref 3.5–5.5)
SODIUM SERPL-SCNC: 144 MMOL/L (ref 132–146)
TSH SERPL DL<=0.05 MIU/L-ACNC: 0.5 UIU/ML (ref 0.27–4.2)
WBC OTHER # BLD: 8.1 K/UL (ref 4.5–11.5)

## 2024-07-30 ENCOUNTER — OUTSIDE SERVICES (OUTPATIENT)
Dept: FAMILY MEDICINE CLINIC | Age: 89
End: 2024-07-30

## 2024-07-30 DIAGNOSIS — Z00.8 ENCOUNTER FOR OTHER GENERAL EXAMINATION: Primary | ICD-10-CM

## 2024-08-15 ENCOUNTER — OUTSIDE SERVICES (OUTPATIENT)
Dept: FAMILY MEDICINE CLINIC | Age: 89
End: 2024-08-15

## 2024-08-15 DIAGNOSIS — M81.0 AGE-RELATED OSTEOPOROSIS WITHOUT CURRENT PATHOLOGICAL FRACTURE: ICD-10-CM

## 2024-08-15 DIAGNOSIS — I48.0 PAROXYSMAL ATRIAL FIBRILLATION (HCC): ICD-10-CM

## 2024-08-15 DIAGNOSIS — E03.9 HYPOTHYROIDISM, UNSPECIFIED TYPE: ICD-10-CM

## 2024-08-15 DIAGNOSIS — N18.9 CHRONIC KIDNEY DISEASE, UNSPECIFIED CKD STAGE: Primary | ICD-10-CM

## 2024-08-15 DIAGNOSIS — E78.2 MIXED HYPERLIPIDEMIA: ICD-10-CM

## 2024-08-15 NOTE — PROGRESS NOTES
8/15/2024      Kary Arango  8/17/1920    This resident is being seen today for a follow-up evaluation visit.   She is a resident who has long-term medical conditions including , hypothyroidism, primary hyperparathyroidism, vitamin D deficiency, mixed hyperlipidemia, hypomagnesemia, hypokalemia, benign essential hypertension, Mobitz type II atrioventricular block, paroxysmal atrial fibrillation, sick sinus syndrome, osteoarthritis of the knee, senile osteoporosis, malignant neoplasm to the breast status post mastectomy, polyp of the colon, cardiac pacemaker in situ. She is a 89 y.o. female resident who is being seen today for a follow-up relation with this resident up and about in her room and not using her walker which she described as \"for show only.\"  She is a resident who does have some baseline confusion and was in no acute distress throughout my examination.  She denied complaints regarding any headaches or dizziness, sore throat, chest pain, nausea or vomiting, constipation or diarrhea, fever or chills, falls or syncopal events.        Medications:  Antiacid 750 mg daily  Aquaphor to the bilateral lower extremities daily  Aspirin 81 mg daily  Bumetanide 1 mg twice daily  Diltiazem 180 mg ER daily  Docusate sodium 100 mg daily  Ferrous sulfate 325 mg daily  Levothyroxine 50 mcg daily  Medihoney gel wound to the bilateral lower extremities  Mirtazapine 7.5 mg at bedtime  Multivitamin daily  Niacin 500 mg twice daily  Potassium chloride 20 mill equivalents daily  Vitamin D3 3000 units daily  Acetaminophen as needed      Objective     Vital Signs: /51 pulse 68 respirations 16 temperature 96.6 O2 93% weight 113.8 pounds       Physical examination:Skin is essentially warm and dry. HEENT otherwise unremarkable. Neck is supple. Heart regular rate and rhythm. No rubs, gallops or murmurs noted.  Lungs are clear to auscultation.  No evidence of rhonchi, rales, or wheezing. Abdomen is soft, supple and

## 2024-08-25 VITALS
DIASTOLIC BLOOD PRESSURE: 63 MMHG | OXYGEN SATURATION: 93 % | HEIGHT: 60 IN | WEIGHT: 113 LBS | SYSTOLIC BLOOD PRESSURE: 118 MMHG | RESPIRATION RATE: 16 BRPM | BODY MASS INDEX: 22.19 KG/M2 | HEART RATE: 88 BPM | TEMPERATURE: 97.1 F

## 2024-08-25 SDOH — ECONOMIC STABILITY: INCOME INSECURITY: HOW HARD IS IT FOR YOU TO PAY FOR THE VERY BASICS LIKE FOOD, HOUSING, MEDICAL CARE, AND HEATING?: NOT HARD AT ALL

## 2024-08-25 SDOH — ECONOMIC STABILITY: FOOD INSECURITY: WITHIN THE PAST 12 MONTHS, THE FOOD YOU BOUGHT JUST DIDN'T LAST AND YOU DIDN'T HAVE MONEY TO GET MORE.: NEVER TRUE

## 2024-08-25 SDOH — ECONOMIC STABILITY: FOOD INSECURITY: WITHIN THE PAST 12 MONTHS, YOU WORRIED THAT YOUR FOOD WOULD RUN OUT BEFORE YOU GOT MONEY TO BUY MORE.: NEVER TRUE

## 2024-08-25 ASSESSMENT — PATIENT HEALTH QUESTIONNAIRE - PHQ9
SUM OF ALL RESPONSES TO PHQ9 QUESTIONS 1 & 2: 0
SUM OF ALL RESPONSES TO PHQ QUESTIONS 1-9: 0
2. FEELING DOWN, DEPRESSED OR HOPELESS: NOT AT ALL
1. LITTLE INTEREST OR PLEASURE IN DOING THINGS: NOT AT ALL

## 2024-08-25 ASSESSMENT — LIFESTYLE VARIABLES
HOW OFTEN DO YOU HAVE A DRINK CONTAINING ALCOHOL: NEVER
HOW MANY STANDARD DRINKS CONTAINING ALCOHOL DO YOU HAVE ON A TYPICAL DAY: PATIENT DOES NOT DRINK

## 2024-08-27 NOTE — PROGRESS NOTES
as PCP - General (Family Medicine)      Reviewed and updated this visit:  Tobacco  Allergies  Meds  Med Hx  Surg Hx  Soc Hx  Fam Hx

## 2024-08-27 NOTE — PATIENT INSTRUCTIONS
Preventing Falls: Care Instructions  Injuries and health problems such as trouble walking or poor eyesight can increase your risk of falling. So can some medicines. But there are things you can do to help prevent falls. You can exercise to get stronger. You can also arrange your home to make it safer.    Talk to your doctor about the medicines you take. Ask if any of them increase the risk of falls and whether they can be changed or stopped.   Try to exercise regularly. It can help improve your strength and balance. This can help lower your risk of falling.         Practice fall safety and prevention.   Wear low-heeled shoes that fit well and give your feet good support. Talk to your doctor if you have foot problems that make this hard.  Carry a cellphone or wear a medical alert device that you can use to call for help.  Use stepladders instead of chairs to reach high objects. Don't climb if you're at risk for falls. Ask for help, if needed.  Wear the correct eyeglasses, if you need them.        Make your home safer.   Remove rugs, cords, clutter, and furniture from walkways.  Keep your house well lit. Use night-lights in hallways and bathrooms.  Install and use sturdy handrails on stairways.  Wear nonskid footwear, even inside. Don't walk barefoot or in socks without shoes.        Be safe outside.   Use handrails, curb cuts, and ramps whenever possible.  Keep your hands free by using a shoulder bag or backpack.  Try to walk in well-lit areas. Watch out for uneven ground, changes in pavement, and debris.  Be careful in the winter. Walk on the grass or gravel when sidewalks are slippery. Use de-icer on steps and walkways. Add non-slip devices to shoes.    Put grab bars and nonskid mats in your shower or tub and near the toilet. Try to use a shower chair or bath bench when bathing.   Get into a tub or shower by putting in your weaker leg first. Get out with your strong side first. Have a phone or medical alert  Limit drinks and foods with added sugar. These include candy, desserts, and soda pop.   Heart-healthy lifestyle    If your doctor recommends it, get more exercise. For many people, walking is a good choice. Or you may want to swim, bike, or do other activities. Bit by bit, increase the time you're active every day. Try for at least 30 minutes on most days of the week.     Try to quit or cut back on using tobacco and other nicotine products. This includes smoking and vaping. If you need help quitting, talk to your doctor about stop-smoking programs and medicines. These can increase your chances of quitting for good. Quitting is one of the most important things you can do to protect your heart. It is never too late to quit. Try to avoid secondhand smoke too.     Stay at a weight that's healthy for you. Talk to your doctor if you need help losing weight.     Try to get 7 to 9 hours of sleep each night.     Limit alcohol to 2 drinks a day for men and 1 drink a day for women. Too much alcohol can cause health problems.     Manage other health problems such as diabetes, high blood pressure, and high cholesterol. If you think you may have a problem with alcohol or drug use, talk to your doctor.   Medicines    Take your medicines exactly as prescribed. Call your doctor if you think you are having a problem with your medicine.     If your doctor recommends aspirin, take the amount directed each day. Make sure you take aspirin and not another kind of pain reliever, such as acetaminophen (Tylenol).   When should you call for help?   Call 911 if you have symptoms of a heart attack. These may include:    Chest pain or pressure, or a strange feeling in the chest.     Sweating.     Shortness of breath.     Pain, pressure, or a strange feeling in the back, neck, jaw, or upper belly or in one or both shoulders or arms.     Lightheadedness or sudden weakness.     A fast or irregular heartbeat.   After you call 911, the  may

## 2024-09-12 ENCOUNTER — OUTSIDE SERVICES (OUTPATIENT)
Dept: FAMILY MEDICINE CLINIC | Age: 89
End: 2024-09-12
Payer: MEDICARE

## 2024-09-12 DIAGNOSIS — R23.8 SKIN BREAKDOWN: Primary | ICD-10-CM

## 2024-09-12 DIAGNOSIS — K59.00 CONSTIPATION, UNSPECIFIED CONSTIPATION TYPE: ICD-10-CM

## 2024-09-12 DIAGNOSIS — E03.9 HYPOTHYROIDISM, UNSPECIFIED TYPE: ICD-10-CM

## 2024-09-12 DIAGNOSIS — I48.0 PAROXYSMAL ATRIAL FIBRILLATION (HCC): ICD-10-CM

## 2024-09-12 DIAGNOSIS — E55.9 VITAMIN D DEFICIENCY: ICD-10-CM

## 2024-09-12 PROCEDURE — 99349 HOME/RES VST EST MOD MDM 40: CPT | Performed by: NURSE PRACTITIONER

## 2024-10-09 ENCOUNTER — OUTSIDE SERVICES (OUTPATIENT)
Dept: PRIMARY CARE CLINIC | Age: 89
End: 2024-10-09
Payer: MEDICARE

## 2024-10-09 DIAGNOSIS — M15.0 PRIMARY OSTEOARTHRITIS INVOLVING MULTIPLE JOINTS: ICD-10-CM

## 2024-10-09 DIAGNOSIS — I10 PRIMARY HYPERTENSION: ICD-10-CM

## 2024-10-09 DIAGNOSIS — I48.0 PAROXYSMAL ATRIAL FIBRILLATION (HCC): ICD-10-CM

## 2024-10-09 DIAGNOSIS — T07.XXXA WOUNDS, MULTIPLE: ICD-10-CM

## 2024-10-09 DIAGNOSIS — E03.9 HYPOTHYROIDISM, UNSPECIFIED TYPE: Primary | ICD-10-CM

## 2024-10-09 PROCEDURE — 99349 HOME/RES VST EST MOD MDM 40: CPT | Performed by: NURSE PRACTITIONER

## 2024-10-09 NOTE — PROGRESS NOTES
10/9/2024      Kary Najeramarylu  8/17/1920    This resident is being seen today for a follow-up evaluation visit.   She is a resident who has long-term medical conditions including , hypothyroidism, primary hyperparathyroidism, vitamin D deficiency, mixed hyperlipidemia, hypomagnesemia, hypokalemia, benign essential hypertension, Mobitz type II atrioventricular block, paroxysmal atrial fibrillation, sick sinus syndrome, osteoarthritis of the knee, senile osteoporosis, malignant neoplasm to the breast status post mastectomy, polyp of the colon, cardiac pacemaker in situ. She is a 89 y.o. female resident who is being seen today for a follow-up evaluation with this resident remaining alert responsive to verbal and tactile stimuli and does continue to get her legs wrapped on a daily basis.  There has been no worsening as per staffing.  She offers no complaints regarding any pain with respect to the areas and furthermore states that she has no headaches or dizziness, sore throat, chest pain, nausea or vomiting, constipation or diarrhea, fever or chills, falls or syncopal events.          Medications:  Antiacid 750 mg daily  Aquaphor to the bilateral lower extremities daily  Aspirin 81 mg daily  Bumetanide 1 mg twice daily  Diltiazem 180 mg ER daily  Docusate sodium 100 mg daily  Ferrous sulfate 325 mg daily  Levothyroxine 50 mcg daily  Medihoney gel wound to the bilateral lower extremities  Mirtazapine 7.5 mg at bedtime  Multivitamin daily  Niacin 500 mg twice daily  Potassium chloride 20 mill equivalents daily  Vitamin D3 3000 units daily  Acetaminophen as needed    Boost 1 shake twice daily      Objective     Vital Signs: /65 pulse 62 respirations 16 temperature 95.1 O2 98% weight 118 pounds       Physical examination:Skin is essentially warm and dry.  She does have some areas of concern to the bilateral lower extremities with the dressings changed on a daily basis.  HEENT otherwise unremarkable. Neck is

## 2025-01-15 ENCOUNTER — OUTSIDE SERVICES (OUTPATIENT)
Dept: PRIMARY CARE CLINIC | Age: 89
End: 2025-01-15

## 2025-01-15 DIAGNOSIS — E03.9 HYPOTHYROIDISM, UNSPECIFIED TYPE: ICD-10-CM

## 2025-01-15 DIAGNOSIS — E55.9 VITAMIN D DEFICIENCY: ICD-10-CM

## 2025-01-15 DIAGNOSIS — I10 PRIMARY HYPERTENSION: ICD-10-CM

## 2025-01-15 DIAGNOSIS — E21.3 HYPERPARATHYROIDISM (HCC): ICD-10-CM

## 2025-01-15 DIAGNOSIS — I48.0 PAROXYSMAL ATRIAL FIBRILLATION (HCC): Primary | ICD-10-CM

## 2025-01-15 NOTE — PROGRESS NOTES
1/15/2025      Kary Arango  8/17/1920    This resident is being seen today for a follow-up evaluation visit.   She is a resident who has long-term medical conditions including , hypothyroidism, primary hyperparathyroidism, vitamin D deficiency, mixed hyperlipidemia, hypomagnesemia, hypokalemia, benign essential hypertension, Mobitz type II atrioventricular block, paroxysmal atrial fibrillation, sick sinus syndrome, osteoarthritis of the knee, senile osteoporosis, malignant neoplasm to the breast status post mastectomy, polyp of the colon, cardiac pacemaker in situ. She is a 89 y.o. female resident who is being seen today for a follow-up visit with which this resident was sleeping but did easily awaken and stated that she is \"not sick.\"  She states that she has had no pain, headaches or dizziness, sore throat, chest pain, coughing or shortness of breath, nausea or vomiting, constipation or diarrhea, fever or chills, falls or syncopal events.  She does continue with utilization of the Nizoral shampoo.  She also has the benefit of Ace wrap's to the bilateral lower extremities which she has been tolerating well.        Medications:  Antacid 750 mg daily  Aquaphor to the bilateral lower extremities daily  Aspirin 81 mg daily  Bumetanide 1 mg twice daily  Diltiazem 180 mg ER daily  Docusate sodium 100 mg daily  Ferrous sulfate 325 mg daily  Levothyroxine 50 mcg daily  Medihoney gel wound to the bilateral lower extremities  Mirtazapine 7.5 mg at bedtime  Niacin 500 mg twice daily  Potassium chloride 20 mill equivalents daily  Tab-A-Adela daily  Vitamin D3 3000 units daily  Acetaminophen as needed    Boost 1 shake twice daily      Objective     Vital Signs: /65 pulse 62 respirations 16 temperature 95.1 O2 98% weight 118 pounds       Physical examination:Skin is essentially warm and dry.  She does have some areas of concern to the bilateral lower extremities with the dressings changed on a daily basis.

## 2025-02-17 ENCOUNTER — OUTSIDE SERVICES (OUTPATIENT)
Dept: PRIMARY CARE CLINIC | Age: 89
End: 2025-02-17

## 2025-02-17 DIAGNOSIS — I48.0 PAROXYSMAL ATRIAL FIBRILLATION (HCC): ICD-10-CM

## 2025-02-17 DIAGNOSIS — M15.0 PRIMARY OSTEOARTHRITIS INVOLVING MULTIPLE JOINTS: Primary | ICD-10-CM

## 2025-02-17 DIAGNOSIS — T07.XXXA WOUNDS, MULTIPLE: ICD-10-CM

## 2025-02-17 DIAGNOSIS — I10 PRIMARY HYPERTENSION: ICD-10-CM

## 2025-02-17 DIAGNOSIS — E55.9 VITAMIN D DEFICIENCY: ICD-10-CM

## 2025-02-18 NOTE — PROGRESS NOTES
2/17/2025      Kary Trujillos  8/17/1920    This resident is being seen today for a follow-up evaluation visit.   She is a resident who has long-term medical conditions including , hypothyroidism, primary hyperparathyroidism, vitamin D deficiency, mixed hyperlipidemia, hypomagnesemia, hypokalemia, benign essential hypertension, Mobitz type II atrioventricular block, paroxysmal atrial fibrillation, sick sinus syndrome, osteoarthritis of the knee, senile osteoporosis, malignant neoplasm to the breast status post mastectomy, polyp of the colon, cardiac pacemaker in situ. She is a 89 y.o. female resident who is being seen today for a follow-up evaluation with this resident indicating that she feels that she has been doing relatively well and has been stable.  Staff indicates that she has had no changes in behavior in terms of aggressive, combative, or disruptive behaviors.  She offers no acute complaints today and indicates that she has no pain, headaches or dizziness, sore throat, chest pain, nausea or vomiting, constipation or diarrhea, fever or chills, falls or syncopal events.      Medications:  Antacid 750 mg daily  Aquaphor to the bilateral lower extremities daily  Aspirin 81 mg daily  Bumetanide 1 mg twice daily  Diltiazem 180 mg ER daily  Docusate sodium 100 mg daily  Ferrous sulfate 325 mg daily  Levothyroxine 50 mcg daily  Medihoney gel wound to the bilateral lower extremities  Mirtazapine 7.5 mg at bedtime  Niacin 500 mg twice daily  Potassium chloride 20 mill equivalents daily  Tab-A-Adela daily  Vitamin D3 3000 units daily  Acetaminophen as needed    Boost 1 shake twice daily      Objective     Vital Signs: /53 pulse 69 respirations 16 temperature 97.1 O2 92% weight 117.4 pounds       Physical examination:Skin is essentially warm and dry.  She does have some areas of concern to the bilateral lower extremities with the dressings changed on a daily basis.  HEENT otherwise unremarkable. Neck is

## 2025-03-12 ENCOUNTER — OUTSIDE SERVICES (OUTPATIENT)
Dept: PRIMARY CARE CLINIC | Age: 89
End: 2025-03-12

## 2025-03-12 DIAGNOSIS — E55.9 VITAMIN D DEFICIENCY: ICD-10-CM

## 2025-03-12 DIAGNOSIS — I48.0 PAROXYSMAL ATRIAL FIBRILLATION (HCC): ICD-10-CM

## 2025-03-12 DIAGNOSIS — E03.9 HYPOTHYROIDISM, UNSPECIFIED TYPE: Primary | ICD-10-CM

## 2025-03-12 DIAGNOSIS — M15.0 PRIMARY OSTEOARTHRITIS INVOLVING MULTIPLE JOINTS: ICD-10-CM

## 2025-03-12 DIAGNOSIS — I10 PRIMARY HYPERTENSION: ICD-10-CM

## 2025-03-12 NOTE — PROGRESS NOTES
3/12/2025      Kary Trujillos  8/17/1920    This resident is being seen today for a follow-up evaluation visit.   She is a resident who has long-term medical conditions including , hypothyroidism, primary hyperparathyroidism, vitamin D deficiency, mixed hyperlipidemia, hypomagnesemia, hypokalemia, benign essential hypertension, Mobitz type II atrioventricular block, paroxysmal atrial fibrillation, sick sinus syndrome, osteoarthritis of the knee, senile osteoporosis, malignant neoplasm to the breast status post mastectomy, polyp of the colon, cardiac pacemaker in situ. She is a 89 y.o. female resident who is being seen today for a follow-up visit with which this resident does remain alert responsive to verbal and tactile stimuli.  She has no complaints today regarding any headaches or dizziness, sore throat, chest pain, coughing or shortness of breath, nausea or vomiting, constipation or diarrhea, fever or chills, falls or syncopal events.      Medications:  Antacid 750 mg daily  Aquaphor to the bilateral lower extremities daily  Aspirin 81 mg daily  Bumetanide 1 mg twice daily  Diltiazem 180 mg ER daily  Docusate sodium 100 mg daily  Ferrous sulfate 325 mg daily  Levothyroxine 50 mcg daily  Medihoney gel wound to the bilateral lower extremities  Mirtazapine 7.5 mg at bedtime  Niacin 500 mg twice daily  Potassium chloride 20 mill equivalents daily  Tab-A-Adela daily  Vitamin D3 3000 units daily  Acetaminophen as needed    Boost 1 shake twice daily      Objective     Vital Signs: /60 pulse 68 respirations 16 temperature 98.7 O2 94% weight 117.4 pounds       Physical examination:Skin is essentially warm and dry.  She does have some areas of concern to the bilateral lower extremities with the dressings changed on a daily basis.  HEENT otherwise unremarkable. Neck is supple. Heart regular rate and rhythm. No rubs, gallops or murmurs noted.  Lungs are clear to auscultation.  No evidence of rhonchi, rales, or

## 2025-05-19 ENCOUNTER — OUTSIDE SERVICES (OUTPATIENT)
Dept: PRIMARY CARE CLINIC | Age: 89
End: 2025-05-19
Payer: MEDICARE

## 2025-05-19 DIAGNOSIS — I48.0 PAROXYSMAL ATRIAL FIBRILLATION (HCC): ICD-10-CM

## 2025-05-19 DIAGNOSIS — T07.XXXA WOUNDS, MULTIPLE: Primary | ICD-10-CM

## 2025-05-19 DIAGNOSIS — E03.9 HYPOTHYROIDISM, UNSPECIFIED TYPE: ICD-10-CM

## 2025-05-19 DIAGNOSIS — I10 PRIMARY HYPERTENSION: ICD-10-CM

## 2025-05-19 DIAGNOSIS — E55.9 VITAMIN D DEFICIENCY: ICD-10-CM

## 2025-05-19 PROCEDURE — 99349 HOME/RES VST EST MOD MDM 40: CPT | Performed by: NURSE PRACTITIONER

## 2025-06-04 ENCOUNTER — OUTSIDE SERVICES (OUTPATIENT)
Dept: PRIMARY CARE CLINIC | Age: 89
End: 2025-06-04
Payer: MEDICARE

## 2025-06-04 DIAGNOSIS — M15.0 PRIMARY OSTEOARTHRITIS INVOLVING MULTIPLE JOINTS: ICD-10-CM

## 2025-06-04 DIAGNOSIS — I48.0 PAROXYSMAL ATRIAL FIBRILLATION (HCC): Primary | ICD-10-CM

## 2025-06-04 DIAGNOSIS — E03.9 HYPOTHYROIDISM, UNSPECIFIED TYPE: ICD-10-CM

## 2025-06-04 DIAGNOSIS — E55.9 VITAMIN D DEFICIENCY: ICD-10-CM

## 2025-06-04 DIAGNOSIS — I10 PRIMARY HYPERTENSION: ICD-10-CM

## 2025-06-04 PROCEDURE — 99349 HOME/RES VST EST MOD MDM 40: CPT | Performed by: NURSE PRACTITIONER

## 2025-06-04 NOTE — PROGRESS NOTES
unremarkable. Neck is supple. Heart regular rate and rhythm. No rubs, gallops or murmurs noted.  Lungs are clear to auscultation.  No evidence of rhonchi, rales, or wheezing. Abdomen is soft, supple and non-tender.  Bowel sounds are noted x4 quadrants. No rigidity, guarding or rebound tenderness.  Negative Abraham's, negative McBurney's, negative Tomas's.  Extremities; mild edema noted to noted to the bilateral lower extremities with Ace wraps intact.  No clubbing  or no cyanosis noted.  Neurologically she  is alert and oriented x3.  No evidence of paralysis or paresthesias noted.    Diagnoses and all orders for this visit:    Paroxysmal atrial fibrillation (HCC)  Comments:  Currently controlled with low-dose aspirin with diltiazem    Primary osteoarthritis involving multiple joints  Comments:  Currently stable with multivitamin supplementation    Primary hypertension  Comments:  Controlled with diuretic management along with low-dose aspirin and diltiazem    Vitamin D deficiency  Comments:  Controlled with vitamin supplementation with observation of vitamin D level    Hypothyroidism, unspecified type  Comments:  Currently controlled with levothyroxine and observation of TSH                                            Plan:  Plan of care was discussed with the healthcare team with medications and labs reviewed.  Most recent labs dated from April which were noted and reviewed BUN 29 creatinine 1.2 GFR 41 hemoglobin 12 hematocrit 35.4 TSH 0.844 vitamin D is 60.  At this point she will continue with her current medical regimen which she has been tolerating in a satisfactory manner.  We will continue to track her intakes.  I will plan to reevaluate her in the course of 1 month unless she should need to be seen sooner.    JULIA FERNANDEZ, APRN - CNP      *Note was creating using voice recognition software.  The document was reviewed however grammatical errors may exist.